# Patient Record
Sex: MALE | Race: WHITE
[De-identification: names, ages, dates, MRNs, and addresses within clinical notes are randomized per-mention and may not be internally consistent; named-entity substitution may affect disease eponyms.]

---

## 2018-09-29 ENCOUNTER — HOSPITAL ENCOUNTER (OUTPATIENT)
Dept: HOSPITAL 74 - JER | Age: 73
Setting detail: OBSERVATION
LOS: 1 days | Discharge: HOME | DRG: 149 | End: 2018-09-30
Attending: INTERNAL MEDICINE | Admitting: INTERNAL MEDICINE
Payer: COMMERCIAL

## 2018-09-29 VITALS — BODY MASS INDEX: 22.7 KG/M2

## 2018-09-29 DIAGNOSIS — R45.7: ICD-10-CM

## 2018-09-29 DIAGNOSIS — I25.10: ICD-10-CM

## 2018-09-29 DIAGNOSIS — E78.5: ICD-10-CM

## 2018-09-29 DIAGNOSIS — R42: Primary | ICD-10-CM

## 2018-09-29 DIAGNOSIS — I25.2: ICD-10-CM

## 2018-09-29 DIAGNOSIS — Z98.61: ICD-10-CM

## 2018-09-29 DIAGNOSIS — Z79.01: ICD-10-CM

## 2018-09-29 DIAGNOSIS — Z79.82: ICD-10-CM

## 2018-09-29 DIAGNOSIS — I10: ICD-10-CM

## 2018-09-29 DIAGNOSIS — J45.909: ICD-10-CM

## 2018-09-29 DIAGNOSIS — E86.0: ICD-10-CM

## 2018-09-29 DIAGNOSIS — Z95.0: ICD-10-CM

## 2018-09-29 LAB
ALBUMIN SERPL-MCNC: 3.4 G/DL (ref 3.4–5)
ALP SERPL-CCNC: 61 U/L (ref 45–117)
ALT SERPL-CCNC: 25 U/L (ref 13–61)
ANION GAP SERPL CALC-SCNC: 10 MMOL/L (ref 8–16)
AST SERPL-CCNC: 14 U/L (ref 15–37)
BASOPHILS # BLD: 0.4 % (ref 0–2)
BILIRUB SERPL-MCNC: 0.5 MG/DL (ref 0.2–1)
BUN SERPL-MCNC: 19 MG/DL (ref 7–18)
CALCIUM SERPL-MCNC: 8.8 MG/DL (ref 8.5–10.1)
CHLORIDE SERPL-SCNC: 109 MMOL/L (ref 98–107)
CO2 SERPL-SCNC: 22 MMOL/L (ref 21–32)
CREAT SERPL-MCNC: 1.1 MG/DL (ref 0.55–1.3)
DEPRECATED RDW RBC AUTO: 13.7 % (ref 11.9–15.9)
EOSINOPHIL # BLD: 1.2 % (ref 0–4.5)
GLUCOSE SERPL-MCNC: 94 MG/DL (ref 74–106)
HCT VFR BLD CALC: 40.3 % (ref 35.4–49)
HGB BLD-MCNC: 13.4 GM/DL (ref 11.7–16.9)
LYMPHOCYTES # BLD: 11.7 % (ref 8–40)
MCH RBC QN AUTO: 31.7 PG (ref 25.7–33.7)
MCHC RBC AUTO-ENTMCNC: 33.2 G/DL (ref 32–35.9)
MCV RBC: 95.5 FL (ref 80–96)
MONOCYTES # BLD AUTO: 9.9 % (ref 3.8–10.2)
NEUTROPHILS # BLD: 76.8 % (ref 42.8–82.8)
PLATELET # BLD AUTO: 265 K/MM3 (ref 134–434)
PMV BLD: 8.4 FL (ref 7.5–11.1)
POTASSIUM SERPLBLD-SCNC: 4.2 MMOL/L (ref 3.5–5.1)
PROT SERPL-MCNC: 6.6 G/DL (ref 6.4–8.2)
RBC # BLD AUTO: 4.22 M/MM3 (ref 4–5.6)
SODIUM SERPL-SCNC: 141 MMOL/L (ref 136–145)
WBC # BLD AUTO: 8.1 K/MM3 (ref 4–10)

## 2018-09-29 PROCEDURE — G0378 HOSPITAL OBSERVATION PER HR: HCPCS

## 2018-09-29 PROCEDURE — 3E013GC INTRODUCTION OF OTHER THERAPEUTIC SUBSTANCE INTO SUBCUTANEOUS TISSUE, PERCUTANEOUS APPROACH: ICD-10-PCS | Performed by: INTERNAL MEDICINE

## 2018-09-29 PROCEDURE — 3E0337Z INTRODUCTION OF ELECTROLYTIC AND WATER BALANCE SUBSTANCE INTO PERIPHERAL VEIN, PERCUTANEOUS APPROACH: ICD-10-PCS | Performed by: INTERNAL MEDICINE

## 2018-09-29 RX ADMIN — ENOXAPARIN SODIUM SCH: 40 INJECTION SUBCUTANEOUS at 19:02

## 2018-09-29 NOTE — HP
CHIEF COMPLAINT: weakness, palpitations





PCP: in [place]





HISTORY OF PRESENT ILLNESS:





72 year old male with a history of symptomatic bradycardia (s/p [employer] pacemaker placement 8 years ago, last interrogated year ago), 

hypertension, hyperlipidemia, coronary artery disease (last cath 2016 w/o stents

, found 50% obstruction) presents for dizziness, weakness and palpitations at a 

picnic around 1 pm today. He reports that he was a former  for 40 

years attending an annual department picnic, when he got a call telling him 

that his brother had suddenly passed away. Not more than 2 minutes after he 

received the call, he became very fatigued, dizzy and weak. Denied overt chest 

pain. He reports that he had this happen once before about 8 months ago without 

precipitating factors, for which he was hospitalized for 1 day and found 

everything to be normal. Reports that he is feeling much better now. Denies 

chest pain, SOB, nausea, vomiting, diarrhea, fevers, chills. Had colonoscopy 1 

year ago that had 1 benign polyp that was removed.





ER course was notable for:


(1) trop normal


(2) EKG AV paced rhythm


(3) labs wnl





Recent Travel: traveled from [place]





PAST MEDICAL HISTORY: bradycardia, hypertension, hyperlipidemia, CAD





PAST SURGICAL HISTORY: appendectomy, pacemaker placement ([employer])





Social History:


Smoking: never


Alcohol: occasional


Drugs: never





Family History: father with colon cancer


Allergies





No Known Allergies Allergy (Verified 09/29/18 13:37)


 








HOME MEDICATIONS:


 Home Medications











 Medication  Instructions  Recorded


 


Aspirin 81 mg PO DAILY 09/29/18


 


Clopidogrel Bisulfate [Plavix -] 75 mg PO DAILY 09/29/18








REVIEW OF SYSTEMS


CONSTITUTIONAL: 


Absent:  fever, chills, diaphoresis, generalized weakness, malaise, loss of 

appetite, weight change


HEENT: 


Absent:  rhinorrhea, nasal congestion, throat pain, throat swelling, difficulty 

swallowing, mouth swelling, ear pain, eye pain, visual changes


CARDIOVASCULAR: 


Absent: chest pain, syncope, palpitations, irregular heart rate, lightheadedness

, peripheral edema


RESPIRATORY: 


Absent: cough, shortness of breath, dyspnea with exertion, orthopnea, wheezing, 

stridor, hemoptysis


GASTROINTESTINAL:


Absent: abdominal pain, abdominal distension, nausea, vomiting, diarrhea, 

constipation, melena, hematochezia


GENITOURINARY: 


Absent: dysuria, frequency, urgency, hesitancy, hematuria, flank pain, genital 

pain


MUSCULOSKELETAL: 


Absent: myalgia, arthralgia, joint swelling, back pain, neck pain


SKIN: 


Absent: rash, itching, pallor


HEMATOLOGIC/IMMUNOLOGIC: 


Absent: easy bleeding, easy bruising, lymphadenopathy, frequent infections


ENDOCRINE:


Absent: unexplained weight gain, unexplained weight loss, heat intolerance, 

cold intolerance


NEUROLOGIC: 


Absent: headache, focal weakness or paresthesias, dizziness, unsteady gait, 

seizure, mental status changes, bladder or bowel incontinence


PSYCHIATRIC: 


Absent: anxiety, depression, suicidal or homicidal ideation, hallucinations.








PHYSICAL EXAMINATION


 Vital Signs - 24 hr











  09/29/18 09/29/18





  13:34 16:25


 


Temperature 98.5 F 


 


Pulse Rate 62 


 


Pulse Rate [  61





Apical]  


 


Respiratory 18 22 H





Rate  


 


Blood Pressure 127/70 


 


Blood Pressure  146/68





[Left Arm]  


 


O2 Sat by Pulse 96 98





Oximetry (%)  











GENERAL: Awake, alert, and fully oriented, in no acute distress.


HEAD: Normal with no signs of trauma.


EYES: Pupils equal, round and reactive to light, extraocular movements intact, 

sclera anicteric, conjunctiva clear. No lid lag.


EARS, NOSE, THROAT: Ears normal, nares patent, oropharynx clear without 

exudates. Moist mucous membranes.


NECK: Normal range of motion, supple without lymphadenopathy, JVD, or masses.


LUNGS: Breath sounds equal, clear to auscultation bilaterally. No wheezes, and 

no crackles. No accessory muscle use.


HEART: Regular rate and rhythm, normal S1 and S2 without murmur, rub or gallop.


ABDOMEN: Soft, nontender, not distended, normoactive bowel sounds, no guarding, 

no rebound, no masses.  No hepatomegaly or  splenomegaly. 


MUSCULOSKELETAL: Normal range of motion at all joints. No bony deformities or 

tenderness. No CVA tenderness.


UPPER EXTREMITIES: 2+ pulses, warm, well-perfused. No cyanosis. No clubbing. No 

peripheral edema.


LOWER EXTREMITIES: 2+ pulses, warm, well-perfused. No calf tenderness. No 

peripheral edema. 


NEUROLOGICAL:  Cranial nerves II-XII intact. Normal speech. Normal gait.


PSYCHIATRIC: Cooperative. Good eye contact. Appropriate mood and affect.


SKIN: Warm, dry, normal turgor, no rashes or lesions noted, normal capillary 

refill. 


 Laboratory Results - last 24 hr











  09/29/18 09/29/18





  15:13 15:13


 


WBC  8.1 


 


RBC  4.22 


 


Hgb  13.4 


 


Hct  40.3 


 


MCV  95.5 


 


MCH  31.7 


 


MCHC  33.2 


 


RDW  13.7 


 


Plt Count  265 


 


MPV  8.4 


 


Absolute Neuts (auto)  6.2 


 


Neutrophils %  76.8 


 


Lymphocytes %  11.7 


 


Monocytes %  9.9 


 


Eosinophils %  1.2 


 


Basophils %  0.4 


 


Nucleated RBC %  0 


 


Sodium   141


 


Potassium   4.2


 


Chloride   109 H


 


Carbon Dioxide   22


 


Anion Gap   10


 


BUN   19 H


 


Creatinine   1.1


 


Creat Clearance w eGFR   > 60


 


Random Glucose   94


 


Calcium   8.8


 


Total Bilirubin   0.5


 


AST   14 L


 


ALT   25


 


Alkaline Phosphatase   61


 


Troponin I   < 0.02


 


Total Protein   6.6


 


Albumin   3.4











ASSESSMENT/PLAN:





72 year old male with a history of symptomatic bradycardia (s/p [employer] pacemaker placement 8 years ago, last interrogated year ago), 

hypertension, hyperlipidemia, coronary artery disease (last cath 2016 w/o stents

, found 50% obstruction) presents for dizziness, weakness and palpitations at a 

picnic around 1 pm today.





#Fatigue, weakness, dizziness: likely 2/2 stress and emotional response to 

patient's brother passing away, but due to cardiac history, will admit for 

workup to rule out acute coronary syndrome


-echocardiogram


-last pacemaker interrogation was around 1 year ago, will get it interrogated


-cardiology consulted Dr. Berger


-EKG AV paced rhythm


-troponins normal


-need to confirm plavix dose, pharmacy patient provided did not have plavix 

listed


-atorvastatin 40mg





#Hypertension


-patient is on home metoprolol, patient does not remember the dose at this time

, blood pressure is normal, watch tomorrow and resume once confirmed





#Hyperlipidemia


-give atorvastatin 40





#FEN


-no standing fluids


-lytes normal


-cardiac diet





#Prophylaxis


-lovenox 40





#Disposition


-admit tele obs





Visit type





- Emergency Visit


Emergency Visit: Yes


ED Registration Date: 09/29/18


Care time: The patient presented to the Emergency Department on the above date 

and was hospitalized for further evaluation of their emergent condition.





- New Patient


This patient is new to me today: Yes


Date on this admission: 09/29/18





- Critical Care


Critical Care patient: No





Hospitalist Screening





- Colonoscopy Questionnaire


Colonoscopy Questionnaire: 





Colonoscopy Questionnaire








-   Patient:


50 - 75 years old and never had a screening colonoscopy: No


History of colon or rectal polyps, or CA: No


History of IBD, Crohn's disease or UC: No


History of abdominal radiation therapy as a child: No





-   Relative:


1 with colon or rectal CA, or polyps at age 60 or younger: Yes


Colon or rectal CA diagnosed at age 45 or younger: No


Multiple relatives with colon or rectal CA: Yes





-   Outcome:


Screening Result: Positive Screen

## 2018-09-29 NOTE — PDOC
History of Present Illness





<Mary Gross - Last Filed: 18 15:29>





- General


History Source: Patient


Exam Limitations: No Limitations





- History of Present Illness


Initial Comments: 





18 16:41





Mr. Julián Giang is a 72-year-old male with past medical history significant for 

MI 2 years ago with cardiac cath that showed 50% blockage no stents placement, 

on Plavix, Asthma, pacemaker placement (battery replacement in a year) for 

bradycardia (), HTN, and HLD presents to the emergency department with 

weakness and lightheadedness since 1:00 pm today. The patient states he is 

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 

EMS friends. The patient states around 1:00 pm he received a call from his 

brother, who reported that his younger brother just passed away. The patient 

reports he was trying to call his sister when a sudden onset of lightheadedness 

and generalized weakness presented. The patient reports he immediately sat 

down. The patient states his friends noticed that he was pale, and called EMS. 

The patient reports the firefighters gave him an ASA, with mild relief. Patient 

denies chest pain, shortness of breath or N/V. The patient reports 2 previous 

similar episode with the onset his PCP states was secondary to hypoglycemia 

from not eating anything. The patient reports he didnt have much to eat today. 

The patient reports being compliant with his medications today. 


ROS (+) Weakness and lightheadedness 


ROS (-) Chest pain, SOB, diaphoresis, fever, chills, nausea, vomiting, pain, 

LOC or falling. Denies the discomfort is similar to prior MI. 


At the ED, patient denies feeling lightheaded or weak. 


Allergies: 


Social history: Social use of alcohol. No past or present use of tobacco or 

recreational drugs. 


Surgical history: Cardiac Cath and Pacemaker. 


PCP: In [place]. 











<Yolanda Pruitt - Last Filed: 18 16:45>





- General


Chief Complaint: Weakness


Stated Complaint: Weakness


Time Seen by Provider: 18 14:22





Past History





<Mary Gross - Last Filed: 18 15:29>





- Past Medical History


Asthma: Yes


Cardiac Disorders: Yes (MI, PACEMAKER)


COPD: No


HTN: Yes


Hypercholesterolemia: Yes





- Surgical History


Cardiac Surgery: Yes (PACEMAKER)





- Suicide/Smoking/Psychosocial Hx


Smoking History: Never smoked





<Yolanda Pruitt - Last Filed: 18 16:45>





- Past Medical History


Allergies/Adverse Reactions: 


 Allergies











Allergy/AdvReac Type Severity Reaction Status Date / Time


 


No Known Allergies Allergy   Verified 18 13:37











Home Medications: 


Ambulatory Orders





Aspirin 81 mg PO DAILY 18 


Clopidogrel Bisulfate [Plavix -] 75 mg PO DAILY 18 











**Review of Systems





- Review of Systems


Able to Perform ROS?: Yes


Comments:: 





18 16:41





GENERAL/CONSTITUTIONAL: (+) Generalized weakness. No fever or chills. no sweats.


HEAD, EYES, EARS, NOSE AND THROAT: No change in vision or hearing. No ear pain 

or discharge. No sore throat or mouth pain. No difficulty swallowing. No 

congestion. 


CARDIOVASCULAR: No chest pain or palpitations, syncope or edema. +near syncope


RESPIRATORY: No SOB, cough, wheezing, or hemoptysis.


GASTROINTESTINAL No nausea/vomiting. No diarrhea or constipation. No bloody 

stools.


GENITOURINARY: No hematuria, dysuria, frequency, urgency or other changes.


MUSCULOSKELETAL: No joint or muscle swelling or pain. No neck or back pain.


SKIN: No rash or changes in skin color or lesions. 


NEUROLOGIC: (+) Lightheadedness. No headache, vertigo, loss of consciousness, 

or change in strength/sensation. No gait instability.


HEMATOLOGIC/LYMPHATIC: No anemia, easy bruising/bleeding, or history of blood 

clots.


ALLERGIC/IMMUNOLOGIC: No allergies


All other systems reviewed and negative, or as documented in HPI. 











<Yolanda Pruitt - Last Filed: 18 16:45>





*Physical Exam





- Vital Signs


 Last Vital Signs











Temp Pulse Resp BP Pulse Ox


 


 98.5 F   62   18   127/70   96 


 


 18 13:34  18 13:34  18 13:34  18 13:34  18 13:34














- Physical Exam


Comments: 


18 15:29


General:   Well appearing, awake and alert, NAD. 


HEENT:  NCAT, PERRL, EOMI, clear conjunctiva, anicteric, moist mucus membranes, 

clear oropharynx, no oral lesions.. 


Neck:  neck supple, FROM


Resp:  CTAB, normal and even respirations, no respiratory distress


CVS: RRR, no murmurs, 2+ peripheral pulses throughout, no peripheral edema


Abdomen: soft, NTND, no peritoneal signs. 


Back: nontender, normal inspection and ROM


MSK:  no edema, CALDERÓN x4, ROM intact. No clubbing or cyanosis. normal bulk and 

tone.


Neuro: alert, oriented appropriately; no focal neurologic deficits


Skin: warm and well perfused, cap refill <2 sec, normal color








<Mary Gross - Last Filed: 18 15:29>





- Vital Signs


 Last Vital Signs











Temp Pulse Resp BP Pulse Ox


 


 98.5 F   62   18   127/70   96 


 


 18 13:34  18 13:34  18 13:34  18 13:34  18 13:34














<Yolanda Pruitt - Last Filed: 18 16:45>





Procedures





- Bedside Ultrasound


Bedside Ultrasound: Cardiac


Remarks: 





18 15:08





POCUS echo performed, indication includes dizziness/near syncope. views 

obtained (PSLA, PSS, A4, SX). Findings include normal EF >50% with good 

contractility, no pericardial effusion. Normal aortic root <4cm. RV<LV. 

Impression: no acute findings.








18 16:45








<Yolanda Pruitt - Last Filed: 18 16:45>





ED Treatment Course





- LABORATORY


CBC & Chemistry Diagram: 


 18 15:13





 18 15:13





<Mary Gross - Last Filed: 18 15:29>





- LABORATORY


CBC & Chemistry Diagram: 


 18 15:13





 18 15:13





<Yolanda Pruitt - Last Filed: 18 16:45>





Medical Decision Making





- Medical Decision Making





18 14:43





72 YOM with h/o symptomatic bradycardia s/p PPM, HTN, HLD, CAD (last cath in 

2016, with ~50% obstruction) on ASA and plavix, asthma presenting with acute 

onset of weakness, dizziness, palpitations and shakiness since 1pm, when he 

found out his brother .





vitals wnl, normal rate and normotensive.


EKG AV Paced pattern at 60 bpm.


heart score cannot be applied, no cp or sob. however +dizziness/near syncope.


POCUS echo with normal EF, no pericardial effusion, normal Ao root <3cm and RV<

LV, no acute findings.


trop negative x1, lytes and labs wnl. 





DDx includes ACS, angina, chest pain NOS, costochondritis, GERD, pleurisy, 

anxiety, esophageal spasm. Low suspicion for pulmonary embolism or dissection 

with POCUS findings. 





Plan for admit hospitalist team, telemetry observation, ppm interrogation, 

formal comprehensive echo, to r/o ischemia, serial trops and EKG/tele 

monitoring with stressing event.. pt made aware of impression and plan, 

agreeable.





18 16:42





18 16:44





18 16:44








<Yolanda Pruitt - Last Filed: 18 16:45>





*DC/Admit/Observation/Transfer





- Attestations


Scribe Attestion: 





18 15:29





Documentation prepared by Mary Gross, acting as medical scribe for Yolanda Pruitt MD. 





<Mary Gross - Last Filed: 18 15:29>





- Discharge Dispostion


Decision to Admit order: Yes


Decision to Admit order Date/Time: 





18 16:44


Decision to Admit Order











 Category Date Time Status


 


 Decision to Admit to Hospital Routine Admission  18 15:46 Active

















<Yolanda Pruitt - Last Filed: 18 16:45>


Diagnosis at time of Disposition: 


 Near syncope, Weakness








- Discharge Dispostion


Condition at time of disposition: Good

## 2018-09-29 NOTE — PN
Teaching Attending Note


Name of Resident: Iggy Mar





ATTENDING PHYSICIAN STATEMENT





I saw and evaluated the patient.


I reviewed the resident's note and discussed the case with the resident.


I agree with the resident's findings and plan as documented with exceptions 

below.








SUBJECTIVE:


72 yom with PMHx of symptomatic bradycardia s/p PPM, Non obstructive CAD on 

cath 2016, HTN, HLD, heard the news that his brother passed away today, after 

felt weak, washed out, was noted palor and assisted to a chair and drank some 

water. Symptoms resolved  but was advised to come to ED. Symptoms full resolved 

before arrival to the ED, no recurrence since. Minimal PO intake today. SImilar 

episode 6 months ago when was admitted and reportedly had echo with no 

concerns. Gives h/o positional dizziness. 


Patient has been uptodate with his cardiac follow up, last stress test in 6/2018

, reportedly unremarkable. due for his PPM battery change soon, no concerns 

otherwise. 





OBJECTIVE:


 Vital Signs











 Period  Temp  Pulse  Resp  BP Sys/Aguilar  Pulse Ox


 


 Last 24 Hr  98.5 F  62  18  127/70  96








 Intake & Output











 09/26/18 09/27/18 09/28/18 09/29/18





 23:59 23:59 23:59 23:59


 


Weight    177 lb











GENERAL: Awake, alert, and fully oriented, in no acute distress.


HEAD: Normal with no signs of trauma.


EYES: Pupils equal, round and reactive to light, extraocular movements intact, 

sclera anicteric, conjunctiva clear. No lid lag.


EARS, NOSE, THROAT: Ears normal, nares patent, oropharynx clear without 

exudates. Moist mucous membranes.


NECK: Normal range of motion, supple without lymphadenopathy, JVD, or masses, 

no carotid bruit.


LUNGS: Breath sounds equal, clear to auscultation bilaterally. No wheezes, and 

no crackles. No accessory muscle use.


HEART: Regular rate and rhythm, normal S1 and S2 without murmur, rub or gallop.


ABDOMEN: Soft, nontender, not distended, normoactive bowel sounds, no guarding, 

no rebound, no masses.  No hepatomegaly or  splenomegaly. 


MUSCULOSKELETAL: Normal range of motion at all joints. No bony deformities or 

tenderness. No CVA tenderness.


UPPER EXTREMITIES: 2+ pulses, warm, well-perfused. No cyanosis. No clubbing. No 

peripheral edema.


LOWER EXTREMITIES: 2+ pulses, warm, well-perfused. No calf tenderness. No 

peripheral edema. 


NEUROLOGICAL:  Cranial nerves II-XII intact. Normal speech. AAOx3, facial 

symmetry, power 5/5 tongue midline, physiological left mouth angular sagging 

corrected when checking power of facial muscles non focal exam, intact to light 

touch, 


PSYCHIATRIC: Cooperative. Good eye contact. Appropriate mood and affect.


SKIN: Warm, dry, normal turgor, no rashes or lesions noted, normal capillary 

refill. 





 Home Medications











 Medication  Instructions  Recorded


 


Aspirin 81 mg PO DAILY 09/29/18


 


Clopidogrel Bisulfate [Plavix -] 75 mg PO DAILY 09/29/18














Active Medications





Aspirin (Asa -)  81 mg PO DAILY [facility]


Clopidogrel Bisulfate (Plavix -)  75 mg PO DAILY [facility]


Enoxaparin Sodium (Lovenox -)  40 mg SQ DAILY [facility]





 Laboratory Results - last 24 hr











  09/29/18 09/29/18





  15:13 15:13


 


WBC  8.1 


 


RBC  4.22 


 


Hgb  13.4 


 


Hct  40.3 


 


MCV  95.5 


 


MCH  31.7 


 


MCHC  33.2 


 


RDW  13.7 


 


Plt Count  265 


 


MPV  8.4 


 


Absolute Neuts (auto)  6.2 


 


Neutrophils %  76.8 


 


Lymphocytes %  11.7 


 


Monocytes %  9.9 


 


Eosinophils %  1.2 


 


Basophils %  0.4 


 


Nucleated RBC %  0 


 


Sodium   141


 


Potassium   4.2


 


Chloride   109 H


 


Carbon Dioxide   22


 


Anion Gap   10


 


BUN   19 H


 


Creatinine   1.1


 


Creat Clearance w eGFR   > 60


 


Random Glucose   94


 


Calcium   8.8


 


Total Bilirubin   0.5


 


AST   14 L


 


ALT   25


 


Alkaline Phosphatase   61


 


Troponin I   < 0.02


 


Total Protein   6.6


 


Albumin   3.4








EKG: AV pacing


CXR: pending








ASSESSMENT AND PLAN:


72 yom with PMHx of above admitted with episode weakness/pallor after hearing 

news of his brother's death. 





-Weakness/pallor,suspect pallor, r/o arrhtymia, low suspicion for ischemic 

etiology, unlikely neurological


-bradycardia s/p PPM


-Mild dehydration


-Positional dizziness


-Non obstructive CAD





Plan;


Telemetry, repeat trop. PPM check. 


Encourage oral hydration. 


Continue ASA/plavix/statin. retrieve prior home meds.


Cardiology input. 


Dispo d/c in 24 hours if no new events. 


Plan discussed with patient in detail, all questions answered. 


Total admit time 50 min.

## 2018-09-29 NOTE — EKG
Test Reason : 

Blood Pressure : ***/*** mmHG

Vent. Rate : 060 BPM     Atrial Rate : 059 BPM

   P-R Int : 000 ms          QRS Dur : 172 ms

    QT Int : 464 ms       P-R-T Axes : 094 -76 086 degrees

   QTc Int : 464 ms

 

AV dual-paced rhythm

ABNORMAL ECG

NO PREVIOUS ECGS AVAILABLE

Confirmed by CECILIA RUFFIN MD ([id number]) on 9/29/2018 11:00:09 PM

 

Referred By:             Confirmed By:CECILIA RUFFIN MD

## 2018-09-30 VITALS — DIASTOLIC BLOOD PRESSURE: 78 MMHG | SYSTOLIC BLOOD PRESSURE: 140 MMHG | TEMPERATURE: 97.7 F

## 2018-09-30 VITALS — HEART RATE: 63 BPM

## 2018-09-30 LAB
ANION GAP SERPL CALC-SCNC: 10 MMOL/L (ref 8–16)
BUN SERPL-MCNC: 21 MG/DL (ref 7–18)
CALCIUM SERPL-MCNC: 8.7 MG/DL (ref 8.5–10.1)
CHLORIDE SERPL-SCNC: 111 MMOL/L (ref 98–107)
CHOLEST SERPL-MCNC: 143 MG/DL (ref 50–200)
CO2 SERPL-SCNC: 22 MMOL/L (ref 21–32)
CREAT SERPL-MCNC: 0.9 MG/DL (ref 0.55–1.3)
DEPRECATED RDW RBC AUTO: 13.9 % (ref 11.9–15.9)
GLUCOSE SERPL-MCNC: 86 MG/DL (ref 74–106)
HCT VFR BLD CALC: 37 % (ref 35.4–49)
HDLC SERPL-MCNC: 58 MG/DL (ref 40–60)
HGB BLD-MCNC: 12.4 GM/DL (ref 11.7–16.9)
MAGNESIUM SERPL-MCNC: 2 MG/DL (ref 1.8–2.4)
MCH RBC QN AUTO: 31.7 PG (ref 25.7–33.7)
MCHC RBC AUTO-ENTMCNC: 33.4 G/DL (ref 32–35.9)
MCV RBC: 94.8 FL (ref 80–96)
PHOSPHATE SERPL-MCNC: 3.5 MG/DL (ref 2.5–4.9)
PLATELET # BLD AUTO: 223 K/MM3 (ref 134–434)
PMV BLD: 8.5 FL (ref 7.5–11.1)
POTASSIUM SERPLBLD-SCNC: 4.3 MMOL/L (ref 3.5–5.1)
RBC # BLD AUTO: 3.9 M/MM3 (ref 4–5.6)
SODIUM SERPL-SCNC: 143 MMOL/L (ref 136–145)
TRIGL SERPL-MCNC: 63 MG/DL (ref 0–150)
WBC # BLD AUTO: 6.7 K/MM3 (ref 4–10)

## 2018-09-30 RX ADMIN — ENOXAPARIN SODIUM SCH MG: 40 INJECTION SUBCUTANEOUS at 10:04

## 2018-09-30 NOTE — DS
Physical Exam: 


SUBJECTIVE: Patient seen and examined, no events overnight. NO further concerns

, ambulating well, tolerating diet. 








OBJECTIVE:





 Vital Signs











 Period  Temp  Pulse  Resp  BP Sys/Aguilar  Pulse Ox


 


 Last 24 Hr  97.8 F-98.5 F  60-63  18-22  111-146/57-70  95-98








PHYSICAL EXAM





GENERAL: sitting in bed, reading a book, no acute distress


CVS:S1S2 regular


Chest: CTAB, no rales or wheezing


Abdomen:soft, NT, ND, positive bowel sounds


Extremities: no edema


Neuro: AAOX3, power 5/5 sensation intact and symmetric to light touch, EOMI,

tongue midline, toes down going, no pronator drift, non focal and unchanged exam





LABS


 Laboratory Results - last 24 hr











  09/29/18 09/29/18 09/29/18





  15:13 15:13 21:10


 


WBC  8.1  


 


RBC  4.22  


 


Hgb  13.4  


 


Hct  40.3  


 


MCV  95.5  


 


MCH  31.7  


 


MCHC  33.2  


 


RDW  13.7  


 


Plt Count  265  


 


MPV  8.4  


 


Absolute Neuts (auto)  6.2  


 


Neutrophils %  76.8  


 


Lymphocytes %  11.7  


 


Monocytes %  9.9  


 


Eosinophils %  1.2  


 


Basophils %  0.4  


 


Nucleated RBC %  0  


 


Sodium   141 


 


Potassium   4.2 


 


Chloride   109 H 


 


Carbon Dioxide   22 


 


Anion Gap   10 


 


BUN   19 H 


 


Creatinine   1.1 


 


Creat Clearance w eGFR   > 60 


 


Random Glucose   94 


 


Hemoglobin A1c %   


 


Calcium   8.8 


 


Phosphorus   


 


Magnesium   


 


Total Bilirubin   0.5 


 


AST   14 L 


 


ALT   25 


 


Alkaline Phosphatase   61 


 


Creatine Kinase    38


 


Troponin I   < 0.02  < 0.02


 


Total Protein   6.6 


 


Albumin   3.4 


 


Triglycerides   


 


Cholesterol   


 


Total LDL Cholesterol   


 


HDL Cholesterol   


 


TSH   














  09/30/18 09/30/18 09/30/18





  06:20 06:20 06:20


 


WBC  6.7  


 


RBC  3.90 L  


 


Hgb  12.4  


 


Hct  37.0  


 


MCV  94.8  


 


MCH  31.7  


 


MCHC  33.4  


 


RDW  13.9  


 


Plt Count  223  


 


MPV  8.5  


 


Absolute Neuts (auto)   


 


Neutrophils %   


 


Lymphocytes %   


 


Monocytes %   


 


Eosinophils %   


 


Basophils %   


 


Nucleated RBC %   


 


Sodium   143 


 


Potassium   4.3 


 


Chloride   111 H 


 


Carbon Dioxide   22 


 


Anion Gap   10 


 


BUN   21 H 


 


Creatinine   0.9 


 


Creat Clearance w eGFR   > 60 


 


Random Glucose   86 


 


Hemoglobin A1c %    5.4


 


Calcium   8.7 


 


Phosphorus   3.5 


 


Magnesium   2.0 


 


Total Bilirubin   


 


AST   


 


ALT   


 


Alkaline Phosphatase   


 


Creatine Kinase   


 


Troponin I   


 


Total Protein   


 


Albumin   


 


Triglycerides   63 


 


Cholesterol   143 


 


Total LDL Cholesterol   75 


 


HDL Cholesterol   58 


 


TSH   1.73 














  09/30/18





  06:20


 


WBC 


 


RBC 


 


Hgb 


 


Hct 


 


MCV 


 


MCH 


 


MCHC 


 


RDW 


 


Plt Count 


 


MPV 


 


Absolute Neuts (auto) 


 


Neutrophils % 


 


Lymphocytes % 


 


Monocytes % 


 


Eosinophils % 


 


Basophils % 


 


Nucleated RBC % 


 


Sodium 


 


Potassium 


 


Chloride 


 


Carbon Dioxide 


 


Anion Gap 


 


BUN 


 


Creatinine 


 


Creat Clearance w eGFR 


 


Random Glucose 


 


Hemoglobin A1c % 


 


Calcium 


 


Phosphorus 


 


Magnesium 


 


Total Bilirubin 


 


AST 


 


ALT 


 


Alkaline Phosphatase 


 


Creatine Kinase 


 


Troponin I 


 


Total Protein 


 


Albumin 


 


Triglycerides 


 


Cholesterol 


 


Total LDL Cholesterol 


 


HDL Cholesterol 


 


TSH  Cancelled











HOSPITAL COURSE:





Date of Admission:09/29/18





Date of Discharge: 09/30/18











Minutes to complete discharge: 35





Discharge Summary


Reason For Visit: PRE SYNCOPE


Current Active Problems





Hyperlipidemia (Acute)


Near syncope (Acute)


State of emotional shock and stress (Acute)


Status post myocardial infarction (Acute)


Weakness (Acute)








Hospital Course: 





Patient was watched on telemetry with paced rhythm. His PPM was interrogated 

with no concerns. No further events were noted inhouse. He was advised adequate 

hydration and avoid sudden postural changes. He was evaluated by cardiology and 

deemed stable for discharge. 


Condition: Good





- Instructions


Diet, Activity, Other Instructions: 


You were watched on monitor with no concerns. 


Your Pacemaker was interrogated.





Recommend rest, light activity and adequate hydration over next few days and 

resume activities as tolerated.


Avoid sudden postural changes.


COntinue all your medications as before.





Call 911 or come to ED if any new dizziness, or new concerns noted. 


Disposition: HOME





- Home Medications


Comprehensive Discharge Medication List: 


Ambulatory Orders





RX: Aspirin 81 mg PO DAILY 09/29/18 


RX: Clopidogrel Bisulfate [Plavix -] 75 mg PO DAILY 09/29/18 








This patient is new to me today: No


Emergency Visit: Yes


ED Registration Date: 09/29/18


Care time: The patient presented to the Emergency Department on the above date 

and was hospitalized for further evaluation of their emergent condition.


Critical Care patient: No





- Discharge Referral


Referred to [facility] No

## 2018-09-30 NOTE — CON.CARD
Consult


Consult Specialty:: cardiology


Reason for Consultation:: pre syncope





- History of Present Illness


Chief Complaint: Pt A&Ox3; asymptomatic


History of Present Illness: 





 Julián Giang is a 72-year-old male with past medical history significant for MI 

2 years ago with cardiac cath that showed 50% blockage no stents placement, on 

Plavix, pacemaker placement (battery replacement may be needed within a year) 

for bradycardia (), HTN, asthma,and HLD presents to the emergency 

department with weakness and lightheadedness since 1:00 pm today. The patient 

states he is visiting [place] from [place], he was at a [facility] earlier today with 

his police and EMS friends. The patient states around 1:00 pm he received a 

call from his brother, who reported that his younger brother just passed away. 

The patient reports he was trying to call his sister when a sudden onset of 

lightheadedness and generalized weakness presented. The patient reports he 

immediately sat down. The patient states his friends noticed that he was pale, 

and called EMS. The patient reports the firefighters gave him an ASA, with mild 

relief. Patient denies chest pain, shortness of breath or N/V. The patient 

reports 2 previous similar episode with the onset his PCP states was secondary 

to hypoglycemia from not eating anything. The patient reports he didnt have 

much to eat today. The patient reports being compliant with his medications 

today. 


ROS (+) Weakness and lightheadedness 


ROS (-) Chest pain, SOB, diaphoresis, fever, chills, nausea, vomiting, pain, 

LOC or falling. Denies the discomfort is similar to prior MI. 


At the ED, patient denies feeling lightheaded or weak. 


Allergies: 


Social history: Social use of alcohol. No past or present use of tobacco or 

recreational drugs. 


Surgical history: Cardiac Cath and Pacemaker. 





- History Source


History Provided By: Patient, Medical Record


Limitations to Obtaining History: No Limitations





- Past Medical History


Cardio/Vascular: Yes: CAD, CHF, HTN, MI, Other (s/p PPM)


Pulmonary: Yes: Asthma





- Past Surgical History


Additional Surgical History: coronary angiogram





- Smoking History


Smoking history: Former smoker


Have you smoked in the past 12 months: No





Home Medications





- Allergies


Allergies/Adverse Reactions: 


 Allergies











Allergy/AdvReac Type Severity Reaction Status Date / Time


 


No Known Allergies Allergy   Verified 18 13:37














- Home Medications


Home Medications: 


Ambulatory Orders





Aspirin 81 mg PO DAILY 18 


Clopidogrel Bisulfate [Plavix -] 75 mg PO DAILY 18 











Family Disease History





- Family Disease History


Family Disease History: Heart Disease: Brother ( yesterday after 

completeing a triathalon)





Review of Systems





- Review of Systems


Constitutional: reports: No Symptoms


Eyes: reports: No Symptoms


HENT: reports: No Symptoms


Neck: reports: No Symptoms


Cardiovascular: reports: No Symptoms


Respiratory: reports: No Symptoms


Gastrointestinal: reports: No Symptoms


Genitourinary: reports: No Symptoms


Breasts: reports: No Symptoms Reported


Musculoskeletal: reports: No Symptoms


Integumentary: reports: No Symptoms


Neurological: reports: No Symptoms


Endocrine: reports: No Symptoms


Hematology/Lymphatic: reports: No Symptoms


Psychiatric: reports: Anxiety (emotional shock (hearing of unexpected death of 

brother) just before the near-faint)





- Risk Factors


Known Risk Factors: Yes: Age, Gender, Hypercholesterolemia, Hypertension, Prior 

MI /Emb Stroke


Vital Signs: 


 Vital Signs











Temperature  97.8 F   18 06:00


 


Pulse Rate  63   18 06:00


 


Respiratory Rate  18 06:00


 


Blood Pressure  127/60   18 06:00


 


O2 Sat by Pulse Oximetry (%)  98   18 03:07











Constitutional: Yes: Anxious


Eyes: Yes: WNL


HENT: Yes: WNL


Neck: Yes: WNL


Respiratory: Yes: WNL


Gastrointestinal: Yes: WNL


Renal/: No: Anuria


Cardiovascular: Yes: WNL


JVD: No


Carotid Bruit: No


PMI: Non-Displaced


Heart Sounds: Yes: S1, Split S2


Musculoskeletal: Yes: WNL


Extremities: Yes: WNL


Edema: No


Peripheral Pulses WNL: Yes


Integumentary: Yes: WNL


Neurological: Yes: WNL


...Motor Strength: WNL


Psychiatric: Yes: Alert, Oriented, Other





- Other Data


Labs, Other Data: 


 CBC, BMP





 18 06:20 





 Troponin, BNP











  18





  15:13 21:10


 


Troponin I  < 0.02  < 0.02








 Troponin, BNP











  18





  15:13 21:10


 


Troponin I  < 0.02  < 0.02














Imaging





- Results


Chest X-ray: Image Reviewed


EKG: Image Reviewed (AV dual-paced rhythm)





Problem List





- Problems


(1) State of emotional shock and stress


Assessment/Plan: 


profound shock at hearing yesterday that his older brother had  just after 

completing a triathalon.


F/u orrthostatic VS


TSH


GLucose checks WNL.


Keep hydrated.


Consider anxiolytic medication.


Psychological counseling.


Code(s): R45.7 - STATE OF EMOTIONAL SHOCK AND STRESS, UNSPECIFIED   





(2) Status post myocardial infarction


Assessment/Plan: 


hx MI 2 years ago with 50% lesion; no PIC done.


Stress MIBI 2018 reportedly negative for ishcemia (done in [place]).


TNI < 0.02 x 3 this admission.


No acute STT changes on EKG; no arrhythmias or pauses on telemetry; pt has PPM.





From cardiac standpoint, pt may be followed as outpatient.


Consider at least temporary treatment for anxiety/depression.


Code(s): I25.2 - OLD MYOCARDIAL INFARCTION   





(3) Hyperlipidemia


Assessment/Plan: 


On lipitor; f/u lipid panel.


F/u TSH.


Code(s): E78.5 - HYPERLIPIDEMIA, UNSPECIFIED

## 2018-10-01 NOTE — EKG
Test Reason : 

Blood Pressure : ***/*** mmHG

Vent. Rate : 060 BPM     Atrial Rate : 681 BPM

   P-R Int : 000 ms          QRS Dur : 158 ms

    QT Int : 452 ms       P-R-T Axes : 000 -80 093 degrees

   QTc Int : 452 ms

 

AV dual-paced rhythm

ABNORMAL ECG

WHEN COMPARED WITH ECG OF 29-SEP-2018 14:44,

NO SIGNIFICANT CHANGE WAS FOUND

Confirmed by CECILIA RUFFIN MD ([id number]) on 10/1/2018 6:29:05 AM

 

Referred By: NATALIA ACUÑA           Confirmed By:CECILIA RUFFIN MD